# Patient Record
Sex: MALE | Race: WHITE | NOT HISPANIC OR LATINO | URBAN - METROPOLITAN AREA
[De-identification: names, ages, dates, MRNs, and addresses within clinical notes are randomized per-mention and may not be internally consistent; named-entity substitution may affect disease eponyms.]

---

## 2017-03-10 ENCOUNTER — EMERGENCY (EMERGENCY)
Facility: HOSPITAL | Age: 52
LOS: 1 days | Discharge: HOME | End: 2017-03-10
Attending: EMERGENCY MEDICINE | Admitting: EMERGENCY MEDICINE
Payer: COMMERCIAL

## 2017-03-10 VITALS
SYSTOLIC BLOOD PRESSURE: 138 MMHG | HEART RATE: 86 BPM | OXYGEN SATURATION: 95 % | TEMPERATURE: 98 F | DIASTOLIC BLOOD PRESSURE: 82 MMHG | RESPIRATION RATE: 16 BRPM

## 2017-03-10 DIAGNOSIS — R51 HEADACHE: ICD-10-CM

## 2017-03-10 DIAGNOSIS — E78.5 HYPERLIPIDEMIA, UNSPECIFIED: ICD-10-CM

## 2017-03-10 DIAGNOSIS — R42 DIZZINESS AND GIDDINESS: ICD-10-CM

## 2017-03-10 DIAGNOSIS — B20 HUMAN IMMUNODEFICIENCY VIRUS [HIV] DISEASE: ICD-10-CM

## 2017-03-10 LAB
ALBUMIN SERPL ELPH-MCNC: 3.9 G/DL — SIGNIFICANT CHANGE UP (ref 3.4–5)
ALP SERPL-CCNC: 42 U/L — SIGNIFICANT CHANGE UP (ref 40–120)
ALT FLD-CCNC: 21 U/L — SIGNIFICANT CHANGE UP (ref 12–42)
ANION GAP SERPL CALC-SCNC: 9 MMOL/L — SIGNIFICANT CHANGE UP (ref 9–16)
AST SERPL-CCNC: 23 U/L — SIGNIFICANT CHANGE UP (ref 15–37)
BILIRUB SERPL-MCNC: 0.2 MG/DL — SIGNIFICANT CHANGE UP (ref 0.2–1.2)
BUN SERPL-MCNC: 17 MG/DL — SIGNIFICANT CHANGE UP (ref 7–23)
CALCIUM SERPL-MCNC: 8.9 MG/DL — SIGNIFICANT CHANGE UP (ref 8.5–10.5)
CHLORIDE SERPL-SCNC: 109 MMOL/L — HIGH (ref 96–108)
CO2 SERPL-SCNC: 25 MMOL/L — SIGNIFICANT CHANGE UP (ref 22–31)
CREAT SERPL-MCNC: 1.45 MG/DL — HIGH (ref 0.5–1.3)
GLUCOSE SERPL-MCNC: 147 MG/DL — HIGH (ref 70–99)
HCT VFR BLD CALC: 41.7 % — SIGNIFICANT CHANGE UP (ref 39–50)
HGB BLD-MCNC: 14.4 G/DL — SIGNIFICANT CHANGE UP (ref 13–17)
MAGNESIUM SERPL-MCNC: 1.8 MG/DL — SIGNIFICANT CHANGE UP (ref 1.6–2.4)
MCHC RBC-ENTMCNC: 32 PG — SIGNIFICANT CHANGE UP (ref 27–34)
MCHC RBC-ENTMCNC: 34.5 G/DL — SIGNIFICANT CHANGE UP (ref 32–36)
MCV RBC AUTO: 92.7 FL — SIGNIFICANT CHANGE UP (ref 80–100)
PLATELET # BLD AUTO: 234 K/UL — SIGNIFICANT CHANGE UP (ref 150–400)
POTASSIUM SERPL-MCNC: 3.6 MMOL/L — SIGNIFICANT CHANGE UP (ref 3.5–5.3)
POTASSIUM SERPL-SCNC: 3.6 MMOL/L — SIGNIFICANT CHANGE UP (ref 3.5–5.3)
PROT SERPL-MCNC: 8.7 G/DL — HIGH (ref 6.4–8.2)
RBC # BLD: 4.5 M/UL — SIGNIFICANT CHANGE UP (ref 4.2–5.8)
RBC # FLD: 11.8 % — SIGNIFICANT CHANGE UP (ref 10.3–16.9)
SODIUM SERPL-SCNC: 143 MMOL/L — SIGNIFICANT CHANGE UP (ref 132–145)
TROPONIN I SERPL-MCNC: <0.017 NG/ML — LOW (ref 0.02–0.06)
WBC # BLD: 3.9 K/UL — SIGNIFICANT CHANGE UP (ref 3.8–10.5)
WBC # FLD AUTO: 3.9 K/UL — SIGNIFICANT CHANGE UP (ref 3.8–10.5)

## 2017-03-10 PROCEDURE — 93010 ELECTROCARDIOGRAM REPORT: CPT

## 2017-03-10 PROCEDURE — 99285 EMERGENCY DEPT VISIT HI MDM: CPT | Mod: 25

## 2017-03-10 RX ORDER — SODIUM CHLORIDE 9 MG/ML
1000 INJECTION INTRAMUSCULAR; INTRAVENOUS; SUBCUTANEOUS
Qty: 0 | Refills: 0 | Status: DISCONTINUED | OUTPATIENT
Start: 2017-03-10 | End: 2017-03-14

## 2017-03-10 RX ADMIN — SODIUM CHLORIDE 200 MILLILITER(S): 9 INJECTION INTRAMUSCULAR; INTRAVENOUS; SUBCUTANEOUS at 19:36

## 2017-03-10 NOTE — ED PROVIDER NOTE - GASTROINTESTINAL NEGATIVE STATEMENT, MLM
no abdominal pain, no bloating, no constipation, no diarrhea, no nausea and no vomiting. no nausea and no vomiting.

## 2017-03-10 NOTE — ED PROVIDER NOTE - PMH
HIV (human immunodeficiency virus infection) HIV (human immunodeficiency virus infection)    HLD (hyperlipidemia)

## 2017-03-10 NOTE — ED PROVIDER NOTE - CARDIAC, MLM
Normal rate, regular rhythm.  Heart sounds S1, S2.  No murmurs, rubs or gallops. Normal rate, regular rhythm.

## 2017-03-10 NOTE — ED ADULT NURSE NOTE - OBJECTIVE STATEMENT
A&XOX3. States that at around 7pm last night he felt extremely weak and a family member helped him to bed where he passed out and doesn't remember anything time 1am. States shortness of breath only when ambulating. Denies any chest paint. States he feels nauseous and had a headache. No extremity weakness or drift at this time.

## 2017-03-10 NOTE — ED PROVIDER NOTE - NS ED MD SCRIBE ATTENDING SCRIBE SECTIONS
HIV/RESULTS/DISPOSITION/VITAL SIGNS( Pullset)/PAST MEDICAL/SURGICAL/SOCIAL HISTORY/PHYSICAL EXAM/REVIEW OF SYSTEMS/HISTORY OF PRESENT ILLNESS

## 2017-03-10 NOTE — ED PROVIDER NOTE - NEUROLOGICAL, MLM
Alert and oriented, no focal deficits, no motor or sensory deficits. Cranial nerves grossly intact, cerebellar testing normal. clear and coherent speech, normal cranial nerve exam, normal finger to nose and HEIDI.  Steady gait.  No pronator drift.

## 2017-03-10 NOTE — ED PROVIDER NOTE - INTERPRETATION
normal sinus rhythm, Normal axis, Normal IL interval and QRS complex. There are no acute ischemic ST or T-wave changes.

## 2017-03-10 NOTE — ED PROVIDER NOTE - PROGRESS NOTE DETAILS
labs unremarkable except for mild renal dysfunction.  d/w patient in detail.  Conservative management discussed with the patient in detail.  Close PMD follow up encouraged.  Strict ED return instructions discussed in detail and patient given the opportunity to ask any questions about their discharge diagnosis and instructions

## 2017-03-10 NOTE — ED ADULT TRIAGE NOTE - CHIEF COMPLAINT QUOTE
Pt with vision changes "lightning bolts" flashing, dizziness, + nausea, HA, no facial droop, no slurred speech

## 2017-03-10 NOTE — ED PROVIDER NOTE - NEURO NEGATIVE STATEMENT, MLM
+Headache. no loss of consciousness, no sensory deficits, and no weakness. +Headache, + dizziness no loss of consciousness, no sensory deficits, and no weakness.

## 2017-03-10 NOTE — ED PROVIDER NOTE - OBJECTIVE STATEMENT
51 y/o M with pmhx HIV (takes Triumeq) presents after an episode of dizziness and feeling faint earlier today. Reports recent HA for the past week. No LOC. No associated chest pain, SOB, vomiting, weakness. Endorses history of migraines but says he has not had for many years. 53 y/o M with pmhx HIV (takes Triumeq) presents after an episode of dizziness and feeling faint last night while eating dinner. Describes intermittent episodes of "spotting" in his vision. No LOC. No associated chest pain, SOB, vomiting, weakness. Endorses history of migraines but says he has not had for many years. 51 y/o M with pmhx HIV (takes Triumeq) presents after an episode of dizziness and feeling faint last night while eating dinner. Describes intermittent episodes of "spotting" in his vision. No LOC. No associated chest pain, SOB, vomiting, weakness. Endorses history of migraines but says he has not had for many years. Denies toxic habits.  Denies recent head trauma

## 2017-03-10 NOTE — ED PROVIDER NOTE - MEDICAL DECISION MAKING DETAILS
53 y/o M with HIV, distant history of migraines, presents with near syncope last night while eating dinner, off and on visual floaters, seen and cleared by his ophthalmologist. On arrival appears comfortable and well, no VS derangements, non-focal neurologic exam, no loud murmurs. EKG and basic labs ordered, do not suspect neurologic etiology to his symptoms. 53 y/o M with HIV, distant history of migraines, presents with near syncope last night while eating dinner, off and on visual floaters, seen and cleared by his ophthalmologist. On arrival appears comfortable and well, no VS derangements, non-focal neurologic exam, no loud murmurs. EKG and basic labs ordered, do not suspect neurologic/cardiac etiology to his symptoms.

## 2019-01-14 NOTE — ED ADULT NURSE NOTE - FALL HARM RISK CONCLUSION
Alert-The patient is alert, awake and responds to voice. The patient is oriented to time, place, and person. The triage nurse is able to obtain subjective information.
Fall Risk

## 2023-05-10 NOTE — ED PROVIDER NOTE - NS HIV RISK FACTOR YES
Assumed care of patient @3293. No acute distress noted. Denies pain or discomfort. A&Ox4. VSS on RA. NSR on tele. Denies sob. Continent. Low fiber soft diet. Updated on POC. All needs met at this time.    Problem: Patient/Family Goals  Goal: Patient/Family Long Term Goal  Description: Patient's Long Term Goal: prevent future allergic reactions  Interventions:  - steroids  F/u with allergist  - See additional Care Plan goals for specific interventions  Outcome: Progressing  Goal: Patient/Family Short Term Goal  Description: Patient's Short Term Goal: manage airway  Interventions:   - look out for signs of compromised airway  IS  - benadryl/solumedrol  - See additional Care Plan goals for specific interventions  Outcome: Progressing  Problem: RESPIRATORY - ADULT  Goal: Achieves optimal ventilation and oxygenation  Description: INTERVENTIONS:  - Assess for changes in respiratory status  - Assess for changes in mentation and behavior  - Position to facilitate oxygenation and minimize respiratory effort  - Oxygen supplementation based on oxygen saturation or ABGs  - Provide Smoking Cessation handout, if applicable  - Encourage broncho-pulmonary hygiene including cough, deep breathe, Incentive Spirometry  - Assess the need for suctioning and perform as needed  - Assess and instruct to report SOB or any respiratory difficulty  - Respiratory Therapy support as indicated  - Manage/alleviate anxiety  - Monitor for signs/symptoms of CO2 retention  Outcome: Progressing  Problem: Impaired Swallowing  Goal: Minimize aspiration risk  Description: Interventions:  - Patient should be alert and upright for all feedings (90 degrees preferred)  - Offer food and liquids at a slow rate  - No straws  - Encourage small bites of food and small sips of liquid  - Offer pills one at a time, crush or deliver with applesauce as needed  - Discontinue feeding and notify MD (or speech pathologist) if coughing or persistent throat clearing or wet/gurgly vocal quality is noted  Outcome: Progressing Declined

## 2024-03-21 NOTE — ED PROVIDER NOTE - MUSCULOSKELETAL, MLM
Vaccine Information Statement(s) or the Emergency Use Authorization was given today. This has been reviewed, questions answered, and verbal consent given by Patient for injection(s) and administration of Pneumococcal Conjugate (PCV20).        Patient tolerated without incident. See immunization grid for documentation.     No Range of motion is not limited, no muscle or joint tenderness